# Patient Record
Sex: FEMALE | Race: ASIAN | NOT HISPANIC OR LATINO
[De-identification: names, ages, dates, MRNs, and addresses within clinical notes are randomized per-mention and may not be internally consistent; named-entity substitution may affect disease eponyms.]

---

## 2020-11-11 PROBLEM — Z00.00 ENCOUNTER FOR PREVENTIVE HEALTH EXAMINATION: Status: ACTIVE | Noted: 2020-11-11

## 2020-11-12 ENCOUNTER — APPOINTMENT (OUTPATIENT)
Dept: OBGYN | Facility: CLINIC | Age: 45
End: 2020-11-12
Payer: COMMERCIAL

## 2020-11-12 VITALS
WEIGHT: 7.81 LBS | DIASTOLIC BLOOD PRESSURE: 76 MMHG | SYSTOLIC BLOOD PRESSURE: 114 MMHG | BODY MASS INDEX: 1.47 KG/M2 | HEIGHT: 61 IN

## 2020-11-12 DIAGNOSIS — N92.0 EXCESSIVE AND FREQUENT MENSTRUATION WITH REGULAR CYCLE: ICD-10-CM

## 2020-11-12 PROCEDURE — 99072 ADDL SUPL MATRL&STAF TM PHE: CPT

## 2020-11-12 PROCEDURE — 99386 PREV VISIT NEW AGE 40-64: CPT

## 2020-11-12 RX ORDER — METFORMIN HYDROCHLORIDE 1000 MG/1
1000 TABLET, COATED ORAL
Refills: 0 | Status: ACTIVE | COMMUNITY

## 2020-11-12 RX ORDER — SITAGLIPTIN 100 MG/1
TABLET, FILM COATED ORAL
Refills: 0 | Status: ACTIVE | COMMUNITY

## 2020-11-12 NOTE — PHYSICAL EXAM
[Appropriately responsive] : appropriately responsive [Alert] : alert [No Acute Distress] : no acute distress [No Lymphadenopathy] : no lymphadenopathy [Regular Rate Rhythm] : regular rate rhythm [No Murmurs] : no murmurs [Clear to Auscultation B/L] : clear to auscultation bilaterally [Soft] : soft [Non-distended] : non-distended [No HSM] : No HSM [No Mass] : no mass [Oriented x3] : oriented x3 [FreeTextEntry7] : tender to palpation on left lower quadrant [Examination Of The Breasts] : a normal appearance [No Masses] : no breast masses were palpable [No Lesions] : no lesions  [Labia Majora] : normal [Labia Minora] : normal [Pink Rugae] : pink rugae [No Bleeding] : There was no active vaginal bleeding [Normal] : normal [Normal Position] : in a normal position [Tenderness] : tender [Uterine Adnexae] : normal [Declined] : Patient declined rectal exam [FreeTextEntry6] : tenderness to left side

## 2020-11-12 NOTE — PLAN
[FreeTextEntry1] : thin prep with hr hpv\par cbc, tsh, prolactin, fsh, lh\par Pelvic US \par Mammogram and Breast US\par follow up after Pelvic US and labs for further discussion on options

## 2020-11-12 NOTE — REVIEW OF SYSTEMS
[Urgency] : no urgency [Frequency] : no frequency [Incontinence] : no incontinence [Dysuria] : no dysuria [Urethral Discharge] : no urethral discharge [CVA Pain] : no CVA pain [Genital Rash/Irritation] : no genital rash/irritation [Deepening Voice] : no deepening voice [Feeling Weak] : not feeling weak [Hot Flashes] : no hot flashes [Muscle Weakness] : no muscle weakness [Easy Bleeding] : no easy bleeding [Easy Bruising] : no easy bruising [Swollen Glands] : no swollen glands [History of Anemia] : no history of anemia [Nose Bleeds] : no nose bleeds [Negative] : Musculoskeletal [FreeTextEntry8] : frequent menses with blood clots and cramping

## 2020-11-13 LAB
BASOPHILS # BLD AUTO: 0.06 K/UL
BASOPHILS NFR BLD AUTO: 0.7 %
EOSINOPHIL # BLD AUTO: 0.14 K/UL
EOSINOPHIL NFR BLD AUTO: 1.7 %
HCT VFR BLD CALC: 43.7 %
HGB BLD-MCNC: 13.1 G/DL
IMM GRANULOCYTES NFR BLD AUTO: 0.2 %
LYMPHOCYTES # BLD AUTO: 2.05 K/UL
LYMPHOCYTES NFR BLD AUTO: 24.6 %
MAN DIFF?: NORMAL
MCHC RBC-ENTMCNC: 26.5 PG
MCHC RBC-ENTMCNC: 30 GM/DL
MCV RBC AUTO: 88.3 FL
MONOCYTES # BLD AUTO: 0.63 K/UL
MONOCYTES NFR BLD AUTO: 7.6 %
NEUTROPHILS # BLD AUTO: 5.44 K/UL
NEUTROPHILS NFR BLD AUTO: 65.2 %
PLATELET # BLD AUTO: 310 K/UL
RBC # BLD: 4.95 M/UL
RBC # FLD: 13.7 %
WBC # FLD AUTO: 8.34 K/UL

## 2020-11-16 DIAGNOSIS — B96.89 ACUTE VAGINITIS: ICD-10-CM

## 2020-11-16 DIAGNOSIS — N76.0 ACUTE VAGINITIS: ICD-10-CM

## 2020-11-16 LAB
CYTOLOGY CVX/VAG DOC THIN PREP: ABNORMAL
FSH SERPL-MCNC: 8.1 IU/L
HPV HIGH+LOW RISK DNA PNL CVX: NOT DETECTED
LH SERPL-ACNC: 10.9 IU/L
PROLACTIN SERPL-MCNC: 7.6 NG/ML
TSH SERPL-ACNC: 1.07 UIU/ML

## 2020-11-17 ENCOUNTER — NON-APPOINTMENT (OUTPATIENT)
Age: 45
End: 2020-11-17

## 2020-12-03 ENCOUNTER — APPOINTMENT (OUTPATIENT)
Dept: OBGYN | Facility: CLINIC | Age: 45
End: 2020-12-03
Payer: COMMERCIAL

## 2020-12-03 VITALS
WEIGHT: 125 LBS | BODY MASS INDEX: 23.6 KG/M2 | SYSTOLIC BLOOD PRESSURE: 84 MMHG | HEIGHT: 61 IN | DIASTOLIC BLOOD PRESSURE: 60 MMHG

## 2020-12-03 DIAGNOSIS — R10.2 PELVIC AND PERINEAL PAIN: ICD-10-CM

## 2020-12-03 DIAGNOSIS — N92.1 EXCESSIVE AND FREQUENT MENSTRUATION WITH IRREGULAR CYCLE: ICD-10-CM

## 2020-12-03 PROCEDURE — 99213 OFFICE O/P EST LOW 20 MIN: CPT

## 2020-12-03 PROCEDURE — 99072 ADDL SUPL MATRL&STAF TM PHE: CPT

## 2020-12-03 NOTE — PLAN
[FreeTextEntry1] : Pelvic US to evaluate uterine size, endometrium, adnexa, and rule out fibroids or adnexal mass\par \par follow up after US to discuss options:  hysteroscopy d&c MyoSure and Endometrial Ablation vs hysterectomy/bs

## 2020-12-03 NOTE — PHYSICAL EXAM
[Appropriately responsive] : appropriately responsive [Alert] : alert [No Acute Distress] : no acute distress [No Lymphadenopathy] : no lymphadenopathy [Regular Rate Rhythm] : regular rate rhythm [No Murmurs] : no murmurs [Clear to Auscultation B/L] : clear to auscultation bilaterally [Soft] : soft [Non-tender] : non-tender [Non-distended] : non-distended [No Mass] : no mass [Oriented x3] : oriented x3

## 2020-12-04 ENCOUNTER — ASOB RESULT (OUTPATIENT)
Age: 45
End: 2020-12-04

## 2020-12-04 ENCOUNTER — APPOINTMENT (OUTPATIENT)
Dept: OBGYN | Facility: CLINIC | Age: 45
End: 2020-12-04
Payer: COMMERCIAL

## 2020-12-04 PROCEDURE — 99072 ADDL SUPL MATRL&STAF TM PHE: CPT

## 2020-12-04 PROCEDURE — 76856 US EXAM PELVIC COMPLETE: CPT | Mod: 59

## 2020-12-04 PROCEDURE — 76830 TRANSVAGINAL US NON-OB: CPT

## 2020-12-23 PROBLEM — N76.0 BACTERIAL VAGINOSIS: Status: RESOLVED | Noted: 2020-11-16 | Resolved: 2020-12-23

## 2020-12-29 ENCOUNTER — APPOINTMENT (OUTPATIENT)
Dept: OBGYN | Facility: HOSPITAL | Age: 45
End: 2020-12-29

## 2020-12-31 ENCOUNTER — APPOINTMENT (OUTPATIENT)
Dept: OBGYN | Facility: HOSPITAL | Age: 45
End: 2020-12-31

## 2021-02-23 ENCOUNTER — APPOINTMENT (OUTPATIENT)
Dept: OBGYN | Facility: HOSPITAL | Age: 46
End: 2021-02-23

## 2021-04-02 ENCOUNTER — APPOINTMENT (OUTPATIENT)
Dept: OBGYN | Facility: CLINIC | Age: 46
End: 2021-04-02
Payer: COMMERCIAL

## 2021-04-02 DIAGNOSIS — Z90.710 ACQUIRED ABSENCE OF BOTH CERVIX AND UTERUS: ICD-10-CM

## 2021-04-02 DIAGNOSIS — C80.1 MALIGNANT (PRIMARY) NEOPLASM, UNSPECIFIED: ICD-10-CM

## 2021-04-02 PROCEDURE — 99072 ADDL SUPL MATRL&STAF TM PHE: CPT

## 2021-04-02 PROCEDURE — 99212 OFFICE O/P EST SF 10 MIN: CPT

## 2021-04-02 RX ORDER — NAPROXEN SODIUM 275 MG/1
275 TABLET ORAL 3 TIMES DAILY
Qty: 30 | Refills: 1 | Status: ACTIVE | COMMUNITY
Start: 2021-04-02 | End: 1900-01-01

## 2021-04-02 NOTE — PLAN
[FreeTextEntry1] : Refer to Gyn Oncology for further follow up and management\par Patient, Spouse, and Son voiced understanding\par Copies of path report taken

## 2021-04-02 NOTE — PHYSICAL EXAM
[Appropriately responsive] : appropriately responsive [Alert] : alert [No Acute Distress] : no acute distress [Soft] : soft [Non-tender] : non-tender [Non-distended] : non-distended [Oriented x3] : oriented x3 [FreeTextEntry7] : incisions healed

## 2021-04-13 ENCOUNTER — APPOINTMENT (OUTPATIENT)
Dept: GYNECOLOGIC ONCOLOGY | Facility: CLINIC | Age: 46
End: 2021-04-13
Payer: COMMERCIAL

## 2021-04-13 VITALS
WEIGHT: 120 LBS | OXYGEN SATURATION: 97 % | HEIGHT: 61 IN | HEART RATE: 80 BPM | DIASTOLIC BLOOD PRESSURE: 78 MMHG | BODY MASS INDEX: 22.66 KG/M2 | SYSTOLIC BLOOD PRESSURE: 115 MMHG

## 2021-04-13 DIAGNOSIS — Z78.9 OTHER SPECIFIED HEALTH STATUS: ICD-10-CM

## 2021-04-13 DIAGNOSIS — Z86.39 PERSONAL HISTORY OF OTHER ENDOCRINE, NUTRITIONAL AND METABOLIC DISEASE: ICD-10-CM

## 2021-04-13 DIAGNOSIS — C54.1 MALIGNANT NEOPLASM OF ENDOMETRIUM: ICD-10-CM

## 2021-04-13 DIAGNOSIS — M79.669 PAIN IN UNSPECIFIED LOWER LEG: ICD-10-CM

## 2021-04-13 PROCEDURE — 99203 OFFICE O/P NEW LOW 30 MIN: CPT

## 2021-04-13 PROCEDURE — 99072 ADDL SUPL MATRL&STAF TM PHE: CPT

## 2021-04-13 RX ORDER — METRONIDAZOLE 500 MG/1
500 TABLET ORAL TWICE DAILY
Qty: 20 | Refills: 0 | Status: ACTIVE | COMMUNITY
Start: 2021-04-13 | End: 1900-01-01

## 2021-04-16 PROBLEM — C54.1 ENDOMETRIAL ADENOCARCINOMA: Status: ACTIVE | Noted: 2021-04-16

## 2021-04-16 PROBLEM — Z86.39 HISTORY OF DIABETES MELLITUS: Status: RESOLVED | Noted: 2021-04-16 | Resolved: 2021-04-16

## 2021-04-16 PROBLEM — Z78.9 DOES NOT USE TOBACCO: Status: ACTIVE | Noted: 2021-04-16

## 2021-04-16 PROBLEM — M79.669 CALF PAIN: Status: ACTIVE | Noted: 2021-04-16

## 2021-04-16 RX ORDER — LISINOPRIL 2.5 MG/1
2.5 TABLET ORAL
Qty: 90 | Refills: 0 | Status: DISCONTINUED | COMMUNITY
Start: 2021-03-31

## 2021-04-16 RX ORDER — DAPAGLIFLOZIN 5 MG/1
5 TABLET, FILM COATED ORAL
Qty: 90 | Refills: 0 | Status: DISCONTINUED | COMMUNITY
Start: 2021-03-31

## 2021-04-16 RX ORDER — ATORVASTATIN CALCIUM 10 MG/1
10 TABLET, FILM COATED ORAL
Qty: 90 | Refills: 0 | Status: DISCONTINUED | COMMUNITY
Start: 2021-03-31

## 2021-04-16 RX ORDER — METRONIDAZOLE 7.5 MG/G
0.75 GEL VAGINAL
Qty: 1 | Refills: 1 | Status: DISCONTINUED | COMMUNITY
Start: 2020-11-16 | End: 2021-04-16

## 2021-04-27 ENCOUNTER — APPOINTMENT (OUTPATIENT)
Dept: GYNECOLOGIC ONCOLOGY | Facility: CLINIC | Age: 46
End: 2021-04-27
Payer: COMMERCIAL

## 2021-04-27 VITALS
DIASTOLIC BLOOD PRESSURE: 81 MMHG | WEIGHT: 120 LBS | HEIGHT: 61 IN | OXYGEN SATURATION: 100 % | BODY MASS INDEX: 22.66 KG/M2 | HEART RATE: 78 BPM | SYSTOLIC BLOOD PRESSURE: 122 MMHG

## 2021-04-27 PROCEDURE — 99072 ADDL SUPL MATRL&STAF TM PHE: CPT

## 2021-04-27 PROCEDURE — 99212 OFFICE O/P EST SF 10 MIN: CPT

## 2021-04-27 NOTE — REVIEW OF SYSTEMS
[Negative] : Musculoskeletal [Chest Pain] : chest pain [de-identified] : see HPI [de-identified] : + bilateral lateral thigh discomfort

## 2021-04-27 NOTE — HISTORY OF PRESENT ILLNESS
[FreeTextEntry1] : Pt is a 44 yo with Stage Ia endometrial adenocarcinoma, grade 1 s/p hysterectomy and pelvic lymphadenectomy in [place]. She had post-operative cuff cellulitis treated with Metronidazole. She presents for follow up. She reports improvement of pelvic discomfort. She continues to have pain on bilateral lateral thighs. She also reports chest pain with walking which improves with deep breaths, which she experienced over the last few weeks. She reports the pain resolves after a few minutes. She is not having pain today. She reports hot flushes at night and sweating. No vaginal bleeding.

## 2021-04-27 NOTE — ASSESSMENT
[FreeTextEntry1] : PT is a 46 yo with Stage Ia endometrioid adenocarcinoma, grade 1 s/p laparoscopoic hysterectom and laparoscopic lymphadenectomy in [place]. She had post-op cuff cellulitis which has now resolved. Residual pain in lateral thighs for which I recommend physical therapy as it could be related to lymphadenectomy to genito-femoral nerve injury during lymph node dissection. \par \par Regarding chest pain I discussed importance of following up with her PCP as soon as possible or if chest pain persistent to go to the emergency room for evaluation. I am concerned it is angina and related to her heart. \par \par The hot flushing is normal following removal of the ovaries at her age. I discussed that I cannot give her hormonal supplementation given her cancer, but there are non-hormonal options. She will observe for now and see if symptoms improve. Discussed using fan to manage symptoms at night.

## 2021-04-27 NOTE — END OF VISIT
[FreeTextEntry3] : Follow up in 6 months for surveillance\par PCP for evaluation of chest pain \par Physical therapy for lateral thigh pain. [Time Spent: ___ minutes] : I have spent [unfilled] minutes of time on the encounter.

## 2021-04-28 ENCOUNTER — OUTPATIENT (OUTPATIENT)
Dept: OUTPATIENT SERVICES | Facility: HOSPITAL | Age: 46
LOS: 1 days | End: 2021-04-28
Payer: COMMERCIAL

## 2021-04-28 DIAGNOSIS — Z20.828 CONTACT WITH AND (SUSPECTED) EXPOSURE TO OTHER VIRAL COMMUNICABLE DISEASES: ICD-10-CM

## 2021-04-28 LAB — SARS-COV-2 RNA SPEC QL NAA+PROBE: SIGNIFICANT CHANGE UP

## 2021-04-28 PROCEDURE — 87635 SARS-COV-2 COVID-19 AMP PRB: CPT

## 2021-04-30 NOTE — HISTORY OF PRESENT ILLNESS
[FreeTextEntry1] : 44 yo  via 2  referred by Dr. Zamora for endometrial adenocarcinoma. She reports she was experiencing heavy vaginal bleeding she was seen by GYN and surgical planning with D&C hysteroscopy vs endometrial ablation vs hysterectomy BS was started but she then had a hysterectomy in [place] as she was visiting family and developed even heavier bleeding. Patient s/p TLH BSO 21 in [facility], pathology with endometrial adenocarcinoma, FIGO grade 1, stage 1A. Following results of pathology she underwent subsequent laparoscopic bilateral pelvic node sampling on 21, pathology benign. She has been having post operative incisional pain but has started taking naproxen which is helping. She has had light vaginal bleeding. She reports her flight was 18 hours from [place] on 3/19/21, she has been having R calf pain x 2 days, denies SOB.  Denies unintentional weight loss, change in bowel/bladder habits. \par \par Lpap: 2020 WNL\par Lmammo: 2 years ago WNL per pt\par

## 2021-04-30 NOTE — DISCUSSION/SUMMARY
[FreeTextEntry1] : D/w patient and family my physical exam today was concerning for possible inflammation/infection of vaginal cuff, coupled with pain during pelvic examination I would like to empirically treat with doxycycline x 10 days. Patient should follow up with me in 2 weeks for another exam to determine resolution. If she continues to have this pain she may need a CT. In regards to her calf pain, her flight was ~ 1 month before onset of pain, there are no signs of DVT on examination today, therefore no workup required at this time. We discussed based on her pathology there is no role for chemotherapy/radiation. Family questioned whether or not patient should have PET CT which I do not feel is necessary based on her pathology. Advised she may experience lymphedema and compression stockings are recommended. She will follow up with me in 2 weeks.

## 2021-04-30 NOTE — CHIEF COMPLAINT
[Spouse] : spouse [Family Member] : family member [FreeTextEntry1] : [facility]\par [place] [facility]\par [address]\[place] [address]\par

## 2021-04-30 NOTE — PHYSICAL EXAM
[Absent] : Adnexa(ae): Absent [Normal] : Parametria: Normal [Abnormal] : Bimanual Exam: Abnormal [de-identified] : Well healing laparoscopic incisions. [de-identified] : Inflammation at vaginal cuff [de-identified] : Tenderness [de-identified] : BL LE: homans negative, no warmth/coolness, swelling, discoloration. Patient was seen and examined with chaperone Hattie Castro PA-C.

## 2021-04-30 NOTE — ASSESSMENT
[FreeTextEntry1] : 44 yo female with endometrial adenocarcinoma, FIGO grade 1, stage 1A  s/p hysterectomy in [place].

## 2021-04-30 NOTE — END OF VISIT
[FreeTextEntry3] : Written by Hattie Castro PA-C, acting as a scribe for Dr. Lissy Cantu.\par This note accurately reflects the work and decisions made by me.\par

## 2021-10-19 ENCOUNTER — APPOINTMENT (OUTPATIENT)
Dept: GYNECOLOGIC ONCOLOGY | Facility: CLINIC | Age: 46
End: 2021-10-19

## 2022-06-29 ENCOUNTER — NON-APPOINTMENT (OUTPATIENT)
Age: 47
End: 2022-06-29

## 2022-06-29 ENCOUNTER — APPOINTMENT (OUTPATIENT)
Dept: UROLOGY | Facility: CLINIC | Age: 47
End: 2022-06-29

## 2022-06-29 VITALS
HEART RATE: 96 BPM | DIASTOLIC BLOOD PRESSURE: 68 MMHG | HEIGHT: 61 IN | SYSTOLIC BLOOD PRESSURE: 96 MMHG | TEMPERATURE: 96.8 F | WEIGHT: 128 LBS | BODY MASS INDEX: 24.17 KG/M2 | OXYGEN SATURATION: 99 %

## 2022-06-29 LAB
BILIRUB UR QL STRIP: NEGATIVE
CLARITY UR: CLEAR
COLLECTION METHOD: NORMAL
GLUCOSE UR-MCNC: 1000
HCG UR QL: 0.2 EU/DL
HGB UR QL STRIP.AUTO: NORMAL
KETONES UR-MCNC: NEGATIVE
LEUKOCYTE ESTERASE UR QL STRIP: NEGATIVE
NITRITE UR QL STRIP: NEGATIVE
PH UR STRIP: 5
PROT UR STRIP-MCNC: NEGATIVE
SP GR UR STRIP: 1.02

## 2022-06-29 NOTE — HISTORY OF PRESENT ILLNESS
[FreeTextEntry1] : 47-year-old female lady history of laparoscopic YAAKOV/BSO in [place] status post cellulitis of her calf status post treatment with metronidazole  Back in 2021.\par Path showed endometrial adenocarcinoma.\par She is referred to me with pain

## 2022-12-15 ENCOUNTER — APPOINTMENT (OUTPATIENT)
Dept: OBGYN | Facility: CLINIC | Age: 47
End: 2022-12-15

## 2022-12-15 VITALS
HEIGHT: 61 IN | SYSTOLIC BLOOD PRESSURE: 119 MMHG | BODY MASS INDEX: 25.57 KG/M2 | DIASTOLIC BLOOD PRESSURE: 81 MMHG | WEIGHT: 135.44 LBS

## 2022-12-15 DIAGNOSIS — Z01.419 ENCOUNTER FOR GYNECOLOGICAL EXAMINATION (GENERAL) (ROUTINE) W/OUT ABNORMAL FINDINGS: ICD-10-CM

## 2022-12-15 DIAGNOSIS — Z12.4 ENCOUNTER FOR SCREENING FOR MALIGNANT NEOPLASM OF CERVIX: ICD-10-CM

## 2022-12-15 DIAGNOSIS — R32 UNSPECIFIED URINARY INCONTINENCE: ICD-10-CM

## 2022-12-15 DIAGNOSIS — Z12.39 ENCOUNTER FOR OTHER SCREENING FOR MALIGNANT NEOPLASM OF BREAST: ICD-10-CM

## 2022-12-15 LAB
BILIRUB UR QL STRIP: NORMAL
COLLECTION METHOD: NORMAL
GLUCOSE UR-MCNC: ABNORMAL
HCG UR QL: 0.2 EU/DL
HGB UR QL STRIP.AUTO: ABNORMAL
KETONES UR-MCNC: ABNORMAL
LEUKOCYTE ESTERASE UR QL STRIP: NORMAL
NITRITE UR QL STRIP: NORMAL
PH UR STRIP: 5.5
PROT UR STRIP-MCNC: NORMAL
SP GR UR STRIP: 1.02

## 2022-12-15 PROCEDURE — 81003 URINALYSIS AUTO W/O SCOPE: CPT | Mod: QW

## 2022-12-15 PROCEDURE — 99396 PREV VISIT EST AGE 40-64: CPT

## 2022-12-15 NOTE — PHYSICAL EXAM
[Appropriately responsive] : appropriately responsive [Alert] : alert [No Acute Distress] : no acute distress [No Lymphadenopathy] : no lymphadenopathy [Regular Rate Rhythm] : regular rate rhythm [No Murmurs] : no murmurs [Clear to Auscultation B/L] : clear to auscultation bilaterally [Soft] : soft [Non-distended] : non-distended [No HSM] : No HSM [No Mass] : no mass [Oriented x3] : oriented x3 [FreeTextEntry6] : no dominant masses  no nipple d/c   no skin dimpling [FreeTextEntry7] : tenderness on deep palpation to lower abdominal region [Examination Of The Breasts] : a normal appearance [No Discharge] : no discharge [No Masses] : no breast masses were palpable [No Lesions] : no lesions  [Labia Majora] : normal [Labia Minora] : normal [Normal] : normal [Cystocele] : a cystocele [Rectocele] : a rectocele [No Bleeding] : There was no active vaginal bleeding [Absent] : absent [Uterine Adnexae] : absent [External Hemorrhoid] : external hemorrhoid [FreeTextEntry4] : cuff intact      parous relaxation [FreeTextEntry8] : no pelvic mass palpable

## 2022-12-15 NOTE — PLAN
[FreeTextEntry1] : thin prep with hr hpv\par \par Screening mammogram ordered\par \par Refer to Uro Gyn for further evaluation of urinary incontinence\par \par Urine culture sent\par \par Dulce's Exercises discussed

## 2022-12-17 LAB
BACTERIA UR CULT: NORMAL
HPV HIGH+LOW RISK DNA PNL CVX: NOT DETECTED

## 2022-12-27 ENCOUNTER — APPOINTMENT (OUTPATIENT)
Dept: UROGYNECOLOGY | Facility: CLINIC | Age: 47
End: 2022-12-27
Payer: COMMERCIAL

## 2022-12-27 VITALS
BODY MASS INDEX: 25.49 KG/M2 | HEIGHT: 61 IN | SYSTOLIC BLOOD PRESSURE: 122 MMHG | WEIGHT: 135 LBS | DIASTOLIC BLOOD PRESSURE: 80 MMHG

## 2022-12-27 PROCEDURE — 99204 OFFICE O/P NEW MOD 45 MIN: CPT

## 2022-12-27 NOTE — ASSESSMENT
[FreeTextEntry1] : Patient is a 47-year-old multipara with history of endometrial Cancer now presenting with mixed urinary incontinence (stress greater than urge), overactive bladder, stage II posterior vaginal wall prolapse and intermittent desiccated dysfunction. On examination, there is evidence of urethral hypermobility with a positive empty bladder supine cough stress test, normal postvoid residual, and fair levator ani awareness/contraction. She has no history of recurrent UTI. She admits to consumption of few bladder irritants. Other potential contributing factors include  diabetes and recent weight gain\par \par

## 2022-12-27 NOTE — DISCUSSION/SUMMARY
[FreeTextEntry1] : The patient was counseled regarding the pathophysiology of the above conditions. A total of approximately 60 minutes was spent on this visit, greater than 50%of which was spent on counseling. She was also counseled regarding the risks, benefits, indications, and alternatives of further evaluations studies, as well as various management options. She was given verbal and written information/education on pelvic floor muscle exercises, avoidance of dietary bladder irritants, and other strategies to improve bladder control. She was counseled regarding treatment options for stress urinary incontinence including pelvic floor PT, pessary placement and surgical management with midurethral sling. AUGS interactive tool was used to explain normal anatomy as well as alteration in urethral support associated with stress urinary incontinence. Midurethral sling placement as well as urethral bulking was discussed. Pharmacologic management of overactive bladder and urgency incontinence was discussed.  Treatment options for posterior vaginal wall were reviewed.  After a detailed discussion, following management plan was outlined:\par 1.  Patient is interested in surgical management of stress urinary incontinence and posterior vaginal wall prolapse.  Declines trial of pessary.  We discussed the mid urethral sling procedure in detail and also reviewed the posterior repair in detail.  Patient has a trip planned to [facility] in January and will be back in February.  She wants to have surgery sometime in March.  Recommended urodynamic testing. \par 2.  Discussed avoidance of bladder irritants.  She wants to defer pharmacological treatment for OAB symptoms at this time.  \par 3. UA with micro and urine culture was ordered to exclude UTI.\par 4. Discussed importance of maintaining optimal glycemic control in for bladder health.\par 5. F/u in 4 weeks after urodynamic testing. .

## 2022-12-27 NOTE — HISTORY OF PRESENT ILLNESS
[FreeTextEntry1] : Patient is a 47-year-old para 2 ( x2) who is referred by Dr. HAUSER for evaluation and management of urinary incontinence. S/P TLH/BSO 2021 with Endometrial Cancer Stage I a Grade I in [place].  Patient is accompanied by her son Mahendra\par Patient reports leaking urine with coughing, lifting etc since past 3-4 months. She has been experiencing cough when she started taken Lisinopril\par Also reports leaking with urgency\par Denies sensation of incomplete bladder emptying\par Denies history of frequent UTIs.  \par Denies history of kidney stone\par Daily fluid intake: water, 2 cups of tea, occasional orange juice\par Reports intermittent sensation of vaginal pressure.  She denies feeling a vaginal bulge.  Reports vaginal dryness.  She is sexually active without any discomfort.\par Bowel symptoms: Reports occasional sensation of stool being stuck in the vagina.  She denies spending.  No constipation or diarrhea\par \par GYN history: Reports history of postmenopausal bleeding, was diagnosed with endometrioid endometrial cancer.  She underwent  TLH BSO 21 in [facility], pathology with endometrial adenocarcinoma, FIGO grade 1, stage 1A.  she underwent subsequent laparoscopic bilateral pelvic node sampling on 21, pathology benign.\par \par PMHx: Diabetes  (HbA1C recently  8)\par \par PSHx: TLH/ BSO\par

## 2022-12-27 NOTE — COUNSELING
[FreeTextEntry1] : General: Not in acute distress, alert and oriented x3.\par Neck: Supple. No lymphadenopathy. \par Abdomen: Soft, nontender, and nondistended. No obvious hepatosplenomegaly. No obvious hernias. \par Pelvic Exam: Normal external female genitalia. Saddle sensory exam S2 to S4 is intact. Perineal reflexes not visualized. Urethra is hypermobile without prolapse, exudates, or lesions. Cough stress test is POSITIVE.  Post void residual was checked with I/O cath and was 50 cc of clear urine. Pale and mildly atrophic-appearing vaginal epithelium. No vaginal blood or discharge. Cervix and uterus surgically absent.  No palpable adnexal masses or tenderness. Levator ani contraction is 1/5.  Distal rectocele with Ap & Bp at 0.  Rest of the vaginal compartments are well supported.  Rectovaginal exam: perineal pocket present.  No stool noted.  No enterocele or rectal prolapse appreciated.\par \par

## 2022-12-28 LAB
APPEARANCE: CLEAR
BACTERIA: NEGATIVE
BILIRUBIN URINE: NEGATIVE
BLOOD URINE: NEGATIVE
COLOR: COLORLESS
GLUCOSE QUALITATIVE U: ABNORMAL
HYALINE CASTS: 0 /LPF
KETONES URINE: NEGATIVE
LEUKOCYTE ESTERASE URINE: NEGATIVE
MICROSCOPIC-UA: NORMAL
NITRITE URINE: NEGATIVE
PH URINE: 6
PROTEIN URINE: NEGATIVE
RED BLOOD CELLS URINE: 0 /HPF
SPECIFIC GRAVITY URINE: 1.04
SQUAMOUS EPITHELIAL CELLS: 1 /HPF
UROBILINOGEN URINE: NORMAL
WHITE BLOOD CELLS URINE: 0 /HPF

## 2022-12-29 LAB — BACTERIA UR CULT: NORMAL

## 2022-12-31 LAB — CYTOLOGY CVX/VAG DOC THIN PREP: NORMAL

## 2023-01-11 ENCOUNTER — APPOINTMENT (OUTPATIENT)
Dept: UROGYNECOLOGY | Facility: CLINIC | Age: 48
End: 2023-01-11
Payer: COMMERCIAL

## 2023-01-11 PROCEDURE — 51741 ELECTRO-UROFLOWMETRY FIRST: CPT

## 2023-01-11 PROCEDURE — 51784 ANAL/URINARY MUSCLE STUDY: CPT

## 2023-01-11 PROCEDURE — 51729 CYSTOMETROGRAM W/VP&UP: CPT

## 2023-01-11 PROCEDURE — 51797 INTRAABDOMINAL PRESSURE TEST: CPT

## 2023-01-23 ENCOUNTER — APPOINTMENT (OUTPATIENT)
Dept: UROGYNECOLOGY | Facility: CLINIC | Age: 48
End: 2023-01-23
Payer: COMMERCIAL

## 2023-01-23 VITALS
DIASTOLIC BLOOD PRESSURE: 80 MMHG | HEIGHT: 61 IN | SYSTOLIC BLOOD PRESSURE: 124 MMHG | WEIGHT: 135 LBS | BODY MASS INDEX: 25.49 KG/M2

## 2023-01-23 DIAGNOSIS — N36.41 HYPERMOBILITY OF URETHRA: ICD-10-CM

## 2023-01-23 PROCEDURE — 99213 OFFICE O/P EST LOW 20 MIN: CPT

## 2023-01-23 NOTE — PHYSICAL EXAM
[Chaperone Present] : A chaperone was present in the examining room during all aspects of the physical examination [No Acute Distress] : in no acute distress [Well developed] : well developed [Oriented x3] : oriented to person, place, and time

## 2023-01-23 NOTE — HISTORY OF PRESENT ILLNESS
[FreeTextEntry1] : Patient is a 47-year-old multipara with history of endometrial Cancer now presenting with mixed urinary incontinence (stress greater than urge), overactive bladder, stage II posterior vaginal wall prolapse and intermittent defecatory dysfunction. Patient is interested in surgical management of prolapse and stress urinary incontinence. She presented for urodynamic testing on 1/11/2023. Her urodynamic test findings include normal uroflow with a voided volume of 150 cc, max flow rate of 18, average flow rate of 11, voiding time of 13 seconds and normal continuous configuration; complex cystogram is consistent with normal sensation, normal compliance, normal cystometric capacity of 300 cc, absence of detrusor overactivity and presence of stress urinary incontinence with sequential coughs starting at filled volume of 100 cc; pressure voiding study with voided volume of 320 cc, max flow rate of 17, average flow rate of 8, voiding time of 38 seconds, pressure at peak flow of 7 cm of water (there appears to be malfunctioning of the pressure transducers), and interrupted configuration of the uroflow. Her voiding mechanism appears to be Valsalva.\par She presents today with her  for discussion of urodynamic test results and discussion of surgical plan.

## 2023-01-23 NOTE — DISCUSSION/SUMMARY
[FreeTextEntry1] : I discussed the findings of the urodynamic test in detail with the patient.  Her urodynamic test confirmed presence of stress urinary incontinence, absence of detrusor overactivity and absence of urinary retention with Valsalva mechanism of voiding. Patient appears to be a candidate for mid urethral sling placement. She was again counseled regarding treatment options for stress urinary incontinence including pelvic floor PT, pessary placement and surgical management with midurethral sling. AUGS PFD website resources were used to explain normal anatomy as well as alteration in urethral support associated with stress urinary incontinence and posterior vaginal wall prolapse. We also reviewed treatment options for urinary frequency and nocturia including bladder retraining, Pharmacologic management, Botox etc.  After a detailed discussion, patient strongly believes that she wants to address her stress incontinence surgically.  She is comfortable with the possibly of requiring additional treatment for her OAB symptoms and urgency incontinence if needed. She understands that the midurethral sling is meant to address her stress type urinary incontinence and may not improve or prevent the urgency incontinence. I discussed the potential risks and complications associated with midurethral sling procedure including changes in urinary stream including weak stream, need to spend additional time emptying bladder, voiding dysfunction, temporary bladder catheterization, recurrent urinary tract infection, mesh related complication, pelvic pain/ dyspareunia, injury to bladder/ urethra and need for future surgery etc. \par I also reviewed posterior colpoperineorrhaphy.  Potential risk of the procedure including risk of dyspareunia and injury to the rectum reviewed.  Postoperative restrictions were reviewed.  She verbalized understanding.  The couple had no questions. I provided her written information regarding the midurethral sling procedure. Approximately 25 min spent in today's encounter. 100% of the time was spent in face to face patient counseling.\par

## 2023-04-03 ENCOUNTER — NON-APPOINTMENT (OUTPATIENT)
Age: 48
End: 2023-04-03

## 2023-04-11 ENCOUNTER — APPOINTMENT (OUTPATIENT)
Dept: UROGYNECOLOGY | Facility: CLINIC | Age: 48
End: 2023-04-11

## 2023-04-24 ENCOUNTER — APPOINTMENT (OUTPATIENT)
Dept: UROGYNECOLOGY | Facility: CLINIC | Age: 48
End: 2023-04-24

## 2023-05-09 ENCOUNTER — NON-APPOINTMENT (OUTPATIENT)
Age: 48
End: 2023-05-09

## 2023-06-13 ENCOUNTER — APPOINTMENT (OUTPATIENT)
Dept: UROGYNECOLOGY | Facility: CLINIC | Age: 48
End: 2023-06-13
Payer: COMMERCIAL

## 2023-06-13 PROCEDURE — 57250 REPAIR RECTUM & VAGINA: CPT

## 2023-06-13 PROCEDURE — 57288 REPAIR BLADDER DEFECT: CPT

## 2023-06-16 ENCOUNTER — APPOINTMENT (OUTPATIENT)
Dept: UROGYNECOLOGY | Facility: CLINIC | Age: 48
End: 2023-06-16
Payer: COMMERCIAL

## 2023-06-16 VITALS
SYSTOLIC BLOOD PRESSURE: 116 MMHG | BODY MASS INDEX: 25.49 KG/M2 | HEIGHT: 61 IN | HEART RATE: 93 BPM | TEMPERATURE: 96.9 F | WEIGHT: 135 LBS | DIASTOLIC BLOOD PRESSURE: 79 MMHG

## 2023-06-16 DIAGNOSIS — N81.6 RECTOCELE: ICD-10-CM

## 2023-06-16 DIAGNOSIS — Z98.890 OTHER SPECIFIED POSTPROCEDURAL STATES: ICD-10-CM

## 2023-06-16 PROCEDURE — 99024 POSTOP FOLLOW-UP VISIT: CPT

## 2023-06-16 NOTE — ASSESSMENT
[FreeTextEntry1] : Patient is  making satisfactory postop recovery.  She passed her voiding trial today

## 2023-06-16 NOTE — SUBJECTIVE
[FreeTextEntry1] : Patient is a 48-year-old who underwent retropubic mid urethral sling placement and posterior colpoperineorrhaphy on 6/30/2023.  She was not able to evacuate her bladder completely on postop voiding trial.  She was sent home with the Sheldon catheter.  She presents today for schedule postop follow-up and repeat voiding trial.  Reports doing better.  She is taking ibuprofen and Tylenol on a schedule.  She has not started taking Metamucil.  She has been taking Colace twice a day.  She had a small bowel movement this morning.  Denies heavy bleeding.

## 2023-06-16 NOTE — OBJECTIVE
[Voiding Trial] : Voiding trial was performed [Post Void Residual ____ ml] : Post Void Residual was [unfilled] ml [Soft and Nontender] : soft and nontender [Clean, Dry, Intact] : Clean, Dry, Intact [Healing well] : healing well [FreeTextEntry2] : Superficial ecchymosis noted around the suprapubic trocar site. [FreeTextEntry3] : Posterior vaginal wall incision healing well

## 2023-06-16 NOTE — DISCUSSION/SUMMARY
[Post-Op instructions given. Pt/family verbalizes understanding] : post-operative instructions were provided to the patient/family who verbalize understanding [FreeTextEntry1] : Continue all postop restrictions\par Advised her to avoid every 3-4 hours on a schedule\par Advise use of heating pad to the suprapubic area as needed\par Advised taking MiraLAX once a day till bowel movements regularized.\par Follow-up in 4 to 6 weeks

## 2023-06-19 ENCOUNTER — APPOINTMENT (OUTPATIENT)
Dept: UROGYNECOLOGY | Facility: CLINIC | Age: 48
End: 2023-06-19

## 2023-06-27 DIAGNOSIS — R35.0 FREQUENCY OF MICTURITION: ICD-10-CM

## 2023-06-28 RX ORDER — MIRABEGRON 25 MG/1
25 TABLET, FILM COATED, EXTENDED RELEASE ORAL
Qty: 30 | Refills: 2 | Status: ACTIVE | COMMUNITY
Start: 2023-06-28 | End: 1900-01-01

## 2023-06-29 LAB
APPEARANCE: CLEAR
BACTERIA: NEGATIVE /HPF
BILIRUBIN URINE: NEGATIVE
BLOOD URINE: ABNORMAL
CAST: 0 /LPF
COLOR: YELLOW
EPITHELIAL CELLS: 3 /HPF
GLUCOSE QUALITATIVE U: >=1000 MG/DL
KETONES URINE: ABNORMAL MG/DL
LEUKOCYTE ESTERASE URINE: NEGATIVE
MICROSCOPIC-UA: NORMAL
NITRITE URINE: NEGATIVE
PH URINE: 6
PROTEIN URINE: NORMAL MG/DL
RED BLOOD CELLS URINE: 1 /HPF
REVIEW: NORMAL
SPECIFIC GRAVITY URINE: 1.05
UROBILINOGEN URINE: 0.2 MG/DL
WHITE BLOOD CELLS URINE: 15 /HPF

## 2023-06-30 LAB — BACTERIA UR CULT: NORMAL

## 2023-07-19 ENCOUNTER — APPOINTMENT (OUTPATIENT)
Dept: UROGYNECOLOGY | Facility: CLINIC | Age: 48
End: 2023-07-19
Payer: COMMERCIAL

## 2023-07-19 VITALS
WEIGHT: 135 LBS | DIASTOLIC BLOOD PRESSURE: 70 MMHG | SYSTOLIC BLOOD PRESSURE: 118 MMHG | HEIGHT: 61 IN | BODY MASS INDEX: 25.49 KG/M2

## 2023-07-19 DIAGNOSIS — N32.81 OVERACTIVE BLADDER: ICD-10-CM

## 2023-07-19 DIAGNOSIS — N39.46 MIXED INCONTINENCE: ICD-10-CM

## 2023-07-19 DIAGNOSIS — Z98.890 OTHER SPECIFIED POSTPROCEDURAL STATES: ICD-10-CM

## 2023-07-19 PROCEDURE — 51701 INSERT BLADDER CATHETER: CPT | Mod: 59,58

## 2023-07-19 PROCEDURE — 99213 OFFICE O/P EST LOW 20 MIN: CPT | Mod: 24,25

## 2023-07-19 RX ORDER — SOLIFENACIN SUCCINATE 5 MG/1
5 TABLET ORAL
Qty: 30 | Refills: 3 | Status: ACTIVE | COMMUNITY
Start: 2023-07-19 | End: 1900-01-01

## 2023-07-19 NOTE — HISTORY OF PRESENT ILLNESS
[FreeTextEntry1] : Patient is a 48-year-old who underwent retropubic mid urethral sling placement and posterior colpoperineorrhaphy on 6/30/2023. She was not able to evacuate her bladder completely on postop voiding trial. She was sent home with the Sheldon catheter.  Patient was seen on 6/16/2023 for voiding trial.  She passed a voiding trial and was DC'd home without the Sheldon.  Patient's son then called on 6/27/2023 with concerns of urinary urgency.  Patient was prescribed low-dose Myrbetriq.  Urine culture from 6/27/2023 returned negative however her urine analysis showed presence of significant glucosurea.  Patient returns today for follow-up.  She states that she only took 1 dose of Myrbetriq.  She does not like to take the medicine every day.  She reports few urinary accidents when she leaked on her way to the bathroom.  She is also feels that she does not empty well at night because she has to use the restroom 2-3 times at night.  She reports resolution of vaginal bleeding.

## 2023-07-19 NOTE — ASSESSMENT
[FreeTextEntry1] : Patient is a 48-year-old multipara with history of endometrial cancer,  mixed urinary incontinence (stress greater than urge), overactive bladder, stage II posterior vaginal wall prolapse and intermittent defecatory dysfunction who underwent retropubic mid urethral sling placement and posterior colpoperineorrhaphy on 6/13/2023.  She she is making satisfactory postop recovery.  There is no evidence of urinary retention on today's In-N-Out cath.  Patient has suboptimally controlled diabetes.  She has not been taking Myrbetriq consistently.  Son reports high co-pay for Myrbetriq and requesting alternative

## 2023-07-19 NOTE — DISCUSSION/SUMMARY
[FreeTextEntry1] : Discussed management options for overactive bladder/urgency incontinence.  Explained to the patient that she may have to take a medication daily for at least 3 to 4 months.  Solifenacin 5 mg prescription was sent to her pharmacy.\par Again counseled them regarding optimizing glycemic control.  Discussed finding of glucosuria/ trace ketones on urine analysis.  Advised her to contact their PCP regarding management of diabetes.\par Follow-up in 3 to 4 months

## 2023-07-19 NOTE — PHYSICAL EXAM
[Chaperone Present] : A chaperone was present in the examining room during all aspects of the physical examination [FreeTextEntry1] : Constitutional: Patient is oriented x3.  She is in no distress or pain.  Appears well.\par Abdomen: Soft and nondistended, nontender.  Suprapubic trocar sites have healed well..  Previously noted ecchymosis around the trocar exit sites has resolved\par Pelvic exam: Normal external genitalia.  Cough stress test is negative.  She voided 100 cc in the nuns hat.  Postvoid residual was checked with In-N-Out cath and was 60 cc of clear/dilute urine.  No resistance to passage of urinary catheter noted.  Posterior vaginal wall incision has almost healed completely.  Few loose stitches noted distally along the incision.  No abnormal discharge noted.  All vaginal compartments are well supported

## 2023-10-18 ENCOUNTER — APPOINTMENT (OUTPATIENT)
Dept: UROGYNECOLOGY | Facility: CLINIC | Age: 48
End: 2023-10-18